# Patient Record
Sex: FEMALE | Employment: OTHER | URBAN - METROPOLITAN AREA
[De-identification: names, ages, dates, MRNs, and addresses within clinical notes are randomized per-mention and may not be internally consistent; named-entity substitution may affect disease eponyms.]

---

## 2017-12-07 ENCOUNTER — OFFICE VISIT (OUTPATIENT)
Dept: PAIN MEDICINE | Facility: CLINIC | Age: 82
End: 2017-12-07
Attending: ANESTHESIOLOGY
Payer: MEDICARE

## 2017-12-07 VITALS
HEART RATE: 74 BPM | SYSTOLIC BLOOD PRESSURE: 138 MMHG | HEIGHT: 64 IN | TEMPERATURE: 98 F | DIASTOLIC BLOOD PRESSURE: 59 MMHG | BODY MASS INDEX: 35.85 KG/M2 | WEIGHT: 210 LBS

## 2017-12-07 DIAGNOSIS — M96.1 POSTLAMINECTOMY SYNDROME OF LUMBAR REGION: ICD-10-CM

## 2017-12-07 DIAGNOSIS — M47.819 ARTHROPATHY OF FACET JOINTS AT MULTIPLE LEVELS: ICD-10-CM

## 2017-12-07 DIAGNOSIS — G89.4 CHRONIC PAIN SYNDROME: Primary | ICD-10-CM

## 2017-12-07 PROCEDURE — 99999 PR PBB SHADOW E&M-NEW PATIENT-LVL III: CPT | Mod: PBBFAC,,, | Performed by: ANESTHESIOLOGY

## 2017-12-07 PROCEDURE — 99204 OFFICE O/P NEW MOD 45 MIN: CPT | Mod: S$PBB,GC,, | Performed by: ANESTHESIOLOGY

## 2017-12-07 PROCEDURE — 99203 OFFICE O/P NEW LOW 30 MIN: CPT | Mod: PBBFAC | Performed by: ANESTHESIOLOGY

## 2017-12-07 RX ORDER — PANTOPRAZOLE SODIUM 40 MG/1
TABLET, DELAYED RELEASE ORAL
COMMUNITY
Start: 2017-10-30

## 2017-12-07 RX ORDER — DILTIAZEM HYDROCHLORIDE 240 MG/1
CAPSULE, EXTENDED RELEASE ORAL
COMMUNITY
Start: 2017-10-30

## 2017-12-07 RX ORDER — DONEPEZIL HYDROCHLORIDE 10 MG/1
TABLET, FILM COATED ORAL
Refills: 2 | COMMUNITY
Start: 2017-11-23

## 2017-12-07 RX ORDER — MELOXICAM 7.5 MG/1
TABLET ORAL
COMMUNITY
Start: 2017-10-31 | End: 2018-03-15 | Stop reason: SDUPTHER

## 2017-12-07 RX ORDER — HYDROCODONE BITARTRATE AND ACETAMINOPHEN 5; 325 MG/1; MG/1
TABLET ORAL
COMMUNITY
Start: 2017-10-30 | End: 2018-03-15 | Stop reason: SDUPTHER

## 2017-12-07 RX ORDER — MEMANTINE HYDROCHLORIDE 10 MG/1
TABLET ORAL
COMMUNITY
Start: 2017-10-30

## 2017-12-07 RX ORDER — IBUPROFEN 200 MG
TABLET ORAL
COMMUNITY
Start: 2017-11-13

## 2017-12-07 RX ORDER — CLONAZEPAM 1 MG/1
TABLET ORAL
COMMUNITY
Start: 2017-09-10

## 2017-12-07 RX ORDER — IBUPROFEN 800 MG/1
TABLET ORAL
COMMUNITY
Start: 2017-11-28

## 2017-12-07 RX ORDER — INSULIN GLARGINE 100 [IU]/ML
INJECTION, SOLUTION SUBCUTANEOUS
COMMUNITY
Start: 2017-11-28

## 2017-12-07 RX ORDER — QUETIAPINE FUMARATE 50 MG/1
TABLET, FILM COATED ORAL
Refills: 11 | COMMUNITY
Start: 2017-09-11
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
## 2018-01-04 ENCOUNTER — TELEPHONE (OUTPATIENT)
Dept: PAIN MEDICINE | Facility: CLINIC | Age: 83
End: 2018-01-04

## 2018-01-04 NOTE — TELEPHONE ENCOUNTER
Staff contacted the patient to rescheduled her 1/11/18 appointment with Dr. Dixon. After several rings a message stating the number had been disconnected displayed.    Staff went on to try the second option.   Staff contacted the patient's sister Ms. Yari Muñoz whom states that she will relay the message to the patient regarding her appointment being cancelled and needing to be rescheduled by calling [phone].    Ms. Greenberg vebalized understanding and expressed thanks.

## 2018-03-02 RX ORDER — DIAZEPAM 5 MG/1
5 TABLET ORAL ONCE AS NEEDED
Qty: 2 TABLET | Refills: 0 | Status: SHIPPED | OUTPATIENT
Start: 2018-03-02 | End: 2018-03-02

## 2018-03-05 ENCOUNTER — HOSPITAL ENCOUNTER (OUTPATIENT)
Dept: RADIOLOGY | Facility: OTHER | Age: 83
Discharge: HOME | End: 2018-03-05
Attending: ANESTHESIOLOGY
Payer: MEDICARE

## 2018-03-05 ENCOUNTER — TELEPHONE (OUTPATIENT)
Dept: PAIN MEDICINE | Facility: CLINIC | Age: 83
End: 2018-03-05

## 2018-03-05 DIAGNOSIS — G89.4 CHRONIC PAIN SYNDROME: ICD-10-CM

## 2018-03-05 DIAGNOSIS — M47.819 ARTHROPATHY OF FACET JOINTS AT MULTIPLE LEVELS: ICD-10-CM

## 2018-03-05 DIAGNOSIS — M96.1 POSTLAMINECTOMY SYNDROME OF LUMBAR REGION: ICD-10-CM

## 2018-03-05 PROCEDURE — 72148 MRI LUMBAR SPINE W/O DYE: CPT | Mod: TC

## 2018-03-05 PROCEDURE — 72148 MRI LUMBAR SPINE W/O DYE: CPT | Mod: 26,,, | Performed by: RADIOLOGY

## 2018-03-05 NOTE — TELEPHONE ENCOUNTER
----- Message from Jeff Looney sent at 3/5/2018 10:40 AM CST -----  Contact: patients daughter  _ 1st Request   x_ 2nd Request   _ 3rd Request     Who: NANCI LOTT [[id number]]    Why: patients daughter called stated she is still waiting for prescription for Valium to be called in for the patient who has MRI today at 12:00.  Please call the patient.    What Number to Call Back: [phone]    When to Expect a call back: (Before the end of the day)   -- if call after 3:00 call back will be tomorrow.

## 2018-03-05 NOTE — TELEPHONE ENCOUNTER
----- Message from Vy Dill sent at 3/5/2018  8:04 AM CST -----  _  1st Request  _  2nd Request  _  3rd Request        Who: patient daughter Arely     Why: Requesting a call back in regards to the RX of diazePAM (VALIUM) 5 MG tablet, the patient MRI is today at 12  Please return the call at earliest convenience. Thanks!    What Number to Call Back: [phone]      When to Expect a call back: (Within 24 hours)

## 2018-03-05 NOTE — TELEPHONE ENCOUNTER
Staff contacted the patient this morning to gather more information regarding the Valium.    Patients daughter states that the prescription had not been sent to the patients pharmacy. Patients daughter states that the patient has an MRI at 12 today 03/05/18.    Staff informed the patient that a message would be forwarded to you for approval.    Patients daughter verbalized understanding.     Pharmacy:[facility]

## 2018-03-15 ENCOUNTER — OFFICE VISIT (OUTPATIENT)
Dept: PAIN MEDICINE | Facility: CLINIC | Age: 83
End: 2018-03-15
Attending: ANESTHESIOLOGY
Payer: MEDICARE

## 2018-03-15 VITALS — BODY MASS INDEX: 35.85 KG/M2 | WEIGHT: 210 LBS | HEIGHT: 64 IN

## 2018-03-15 DIAGNOSIS — G89.4 CHRONIC PAIN SYNDROME: Primary | ICD-10-CM

## 2018-03-15 DIAGNOSIS — M96.1 LUMBAR POST-LAMINECTOMY SYNDROME: ICD-10-CM

## 2018-03-15 PROCEDURE — 99214 OFFICE O/P EST MOD 30 MIN: CPT | Mod: S$PBB,GC,, | Performed by: ANESTHESIOLOGY

## 2018-03-15 PROCEDURE — 99999 PR PBB SHADOW E&M-EST. PATIENT-LVL II: CPT | Mod: PBBFAC,,, | Performed by: ANESTHESIOLOGY

## 2018-03-15 PROCEDURE — 99212 OFFICE O/P EST SF 10 MIN: CPT | Mod: PBBFAC | Performed by: ANESTHESIOLOGY

## 2018-03-15 RX ORDER — DICLOFENAC SODIUM 10 MG/G
GEL TOPICAL
COMMUNITY
Start: 2018-03-02

## 2018-03-15 RX ORDER — DULOXETIN HYDROCHLORIDE 30 MG/1
30 CAPSULE, DELAYED RELEASE ORAL 2 TIMES DAILY
Qty: 60 CAPSULE | Refills: 2 | Status: SHIPPED | OUTPATIENT
Start: 2018-03-15 | End: 2018-06-13

## 2018-03-15 RX ORDER — MELOXICAM 7.5 MG/1
7.5 TABLET ORAL
Qty: 30 TABLET | Refills: 0 | Status: SHIPPED | OUTPATIENT
Start: 2018-03-15 | End: 2018-04-14

## 2018-03-15 RX ORDER — HYDROCODONE BITARTRATE AND ACETAMINOPHEN 5; 325 MG/1; MG/1
1 TABLET ORAL EVERY 12 HOURS PRN
Qty: 60 TABLET | Refills: 0 | Status: SHIPPED | OUTPATIENT
Start: 2018-03-15 | End: 2018-04-14

## 2018-03-15 NOTE — PROGRESS NOTES
Chronic patient Established Note (Follow up visit)      SUBJECTIVE:    Raquel Tavares presents to the clinic for a follow-up appointment for back pain. Since the last visit, Raquel Tavares states the pain has been persistant. Current pain intensity is 9/10.    The pt is a 85 yo F w/ significant dementia who presents with both daughters for follow up of back pain. Pt last seen 12/11/17. The pain has not changed significantly since the last visit and if anything has become worse. The pt does not verbalize a pain score upon questioning but her daughters would estimate it is constantly between 7-9. Her pain is worse with walking, exertion. Her pain seems to get better with rest per daughters. Since last visit pt did have MRI performed. However, she did not start the cymbalta or have plain films taken.        Patient/patient daughters denies night fever/night sweats, urinary incontinence, bowel incontinence, significant weight loss, significant motor weakness and loss of sensations.    Pain Disability Index Review:  Last 3 PDI Scores 3/15/2018   Pain Disability Index (PDI) 0       Pain Medications:    - Opioids: None  - Adjuvant Medications: Advil,Motrin ( Ibuprofen) and Mobic (Meloxicam)  - Anti-Coagulants: None  - Others: See med list    Opioid Contract: no     report:  Reviewed and consistent with medication use as prescribed.    Pain Procedures: None    Physical Therapy/Home Exercise: No    Imaging:MRI Lumbar Spine Without Contrast    Narrative     MRI LUMBAR SPINE    TECHNIQUE: MRI lumbar spine was performed without contrast. The following sequences were obtained: Localizer; sagittal T1, T2, STIR; axial T1 and T2.    COMPARISON: Not available.    FINDINGS:    There are 5 lumbar vertebrae.  Status post L3-S1 laminectomy.  There is grade one anterolisthesis at L4-L5.  Vertebral body heights are maintained.  Degenerative endplate changes are noted.  No evidence for marrow infiltrative process.  There is multilevel  disc desiccation with severe disc height loss at L3-L4. Conus terminates at L1 and appears unremarkable. Limited evaluation of posterior abdominal structures is unremarkable.  Paraspinal musculature is moderately atrophied.  Evaluation of sacroiliac joints is unremarkable.    T12-L1: Circumferential disc bulge results in mild bilateral neuroforaminal narrowing.    L1-L2: Circumferential disc bulge results in mild right neuroforaminal narrowing.    L2-L3: Circumferential disc bulge and mild bilateral facet arthropathy result in mild spinal canal stenosis and mild bilateral neuroforaminal narrowing.    L3-L4: Circumferential disc bulge and moderate bilateral facet arthrosis result in moderate spinal canal stenosis and mild right, moderate left neuroforaminal narrowing.  Status post posterior decompression.    L4-L5: Circumferential disc bulge and severe bilateral facet arthrosis result in severe spinal canal stenosis and mild bilateral neuroforaminal narrowing.  Status post posterior decompression.    L5-S1: Circumferential disc bulge and mild bilateral facet arthrosis result in mild bilateral neuroforaminal narrowing.  Status post posterior decompression.   Impression      Status post L3-S1 laminectomy.  Multilevel degenerative changes as detailed above.      Electronically signed by: MALI MOHR MD  Date: 03/05/18       Allergies: Review of patient's allergies indicates:  No Known Allergies    Current Medications:   Current Outpatient Prescriptions   Medication Sig Dispense Refill    clonazePAM (KLONOPIN) 1 MG tablet       diltiaZEM (TIAZAC) 240 MG Cs24       donepezil (ARICEPT) 10 MG tablet TK 1 T PO HS  2    hydrocodone-acetaminophen 5-325mg (NORCO) 5-325 mg per tablet       ibuprofen (ADVIL,MOTRIN) 800 MG tablet       LANTUS 100 unit/mL injection       meloxicam (MOBIC) 7.5 MG tablet       memantine (NAMENDA) 10 MG Tab       pantoprazole (PROTONIX) 40 MG tablet       QUEtiapine (SEROQUEL) 50 MG tablet  "TK 1 T PO QHS  11    SYRINGE AND NEEDLE,INSULIN,1ML (INSULIN SYRINGE-NEEDLE U-100) 1 mL 29 gauge x 7/16" Syrg       VOLTAREN 1 % Gel       diazePAM (VALIUM) 5 MG tablet Take 1 tablet (5 mg total) by mouth once as needed for Anxiety. 2 tablet 0     No current facility-administered medications for this visit.        REVIEW OF SYSTEMS:    Obtained from pt and pt's daughters   GENERAL:  No weight loss, malaise or fevers.  HEENT:  Negative for frequent or significant headaches.  NECK:  Negative for lumps, goiter, pain and significant neck swelling.  RESPIRATORY:  Negative for cough, wheezing or shortness of breath.  CARDIOVASCULAR:  Negative for chest pain or palpitations. + leg swelling bilaterally   GI:  Negative for abdominal discomfort, blood in stools or black stools or change in bowel habits.  MUSCULOSKELETAL:  See HPI.  SKIN:  Negative for lesions, rash, and itching.  PSYCH: +'ve for sleep disturbance, negative for mood disorder and recent psychosocial stressors.  HEMATOLOGY/LYMPHOLOGY:  Negative for prolonged bleeding, bruising easily or swollen nodes.  NEURO:   No history of headaches, syncope, paralysis, seizures or tremors.  All other reviewed and negative other than HPI.    Past Medical History:  History reviewed. No pertinent past medical history.    Past Surgical History:  History reviewed. No pertinent surgical history.    Family History:  History reviewed. No pertinent family history.    Social History:  Social History     Social History    Marital status:      Spouse name: N/A    Number of children: N/A    Years of education: N/A     Social History Main Topics    Smoking status: Never Smoker    Smokeless tobacco: Never Used    Alcohol use None    Drug use: Unknown    Sexual activity: Not Asked     Other Topics Concern    None     Social History Narrative    None       OBJECTIVE:    Ht 5' 4" (1.626 m)   Wt 95.3 kg (210 lb)   BMI 36.05 kg/m²     PHYSICAL EXAMINATION:    General " appearance: Well appearing, in no acute distress, alert. Pt in wheelchair.   Psych:  Mood and affect appropriate.  Skin: Skin color, texture, turgor normal, no rashes or lesions, in both upper and lower body.  Head/face:  Normocephalic, atraumatic. No palpable lymph nodes.  Neck: No pain to palpation over the cervical paraspinous muscles. No pain with neck flexion, extension, or lateral flexion.   Cor: RRR  Pulm: CTA  GI:  Soft and non-tender.  Back: Straight leg raising in the sitting and supine positions is negative to radicular pain. Severe pain to palpation over the spine. Decreased range of motion with pain reproduction. Positive axial loading test bilateral. Positive FABERE,Ganselin and Yeoman's test on the both side.   Extremities: Peripheral joint ROM is decreased and pain free without obvious instability or laxity in all four extremities. No deformities, edema, or skin discoloration. Good capillary refill.  Musculoskeletal: Shoulder and knee provocative maneuvers are negative. Bilateral upper and lower extremity strength is normal and symmetric.  No atrophy or tone abnormalities are noted.    ASSESSMENT: 86 y.o. year old female with low back pain, consistent with      1. Chronic pain syndrome  meloxicam (MOBIC) 7.5 MG tablet    DULoxetine (CYMBALTA) 30 MG capsule    hydrocodone-acetaminophen 5-325mg (NORCO) 5-325 mg per tablet   2. Lumbar post-laminectomy syndrome  meloxicam (MOBIC) 7.5 MG tablet    DULoxetine (CYMBALTA) 30 MG capsule    hydrocodone-acetaminophen 5-325mg (NORCO) 5-325 mg per tablet         PLAN:     - I have stressed the importance of physical activity and a home exercise plan to help with pain and improve health.  - Patient can continue with medications for now since they are providing benefits, using them appropriately, and without side effects.  - Schedule for a caudal epidural injection to help with pain and progress with a Home exercise program.  - Will have pt's daughter release  records to obtain plain films from OSH. If OSH records do not have lumbar plain films and hip plain films we will order these at [facility] [place].   - Discussed potential with pt's family of SCS therapy in the future pending response to caudal injection   - Start Norco and will have pt's daughter sign opioid contract  - Continue meloxicam   - As discussed during previous visit, will start cymbalta therapy   -obtain UDS next visit  - RTC 2 weeks following procedure   - Counseled patient regarding the importance of activity modification, constant sleeping habits and physical therapy.    The above plan and management options were discussed at length with patient. Patient is in agreement with the above and verbalized understanding.    Mario Davis  03/15/2018  Anesthesiology Resident PGY 4    I have personally reviewed the history and exam of this patient and agree with the resident/fellow/NPs note as stated above.    Blas Dixon MD

## 2018-03-19 ENCOUNTER — TELEPHONE (OUTPATIENT)
Dept: PAIN MEDICINE | Facility: CLINIC | Age: 83
End: 2018-03-19

## 2018-03-19 NOTE — TELEPHONE ENCOUNTER
Contacted and spoke with pt's daughter regarding letter with diagnosis. Pt was informed she should have received an after visit summary which would have diagnosis listed. Pt stated she did not receive one after her visit. She was then informed she can either have one mailed out to her home, or she can come  To the office and pick one up. Pt will up a AVS today.      Pt verbalized understanding

## 2018-03-19 NOTE — TELEPHONE ENCOUNTER
----- Message from Cheryl Slater sent at 3/19/2018  9:51 AM CDT -----  Contact: Daughter. [phone]  Patient's daughter would like to speak with you about getting a paper that states the patient's diagnosis. Please advise

## 2018-03-28 ENCOUNTER — HOSPITAL ENCOUNTER (OUTPATIENT)
Facility: OTHER | Age: 83
Discharge: HOME | End: 2018-03-28
Attending: ANESTHESIOLOGY | Admitting: ANESTHESIOLOGY
Payer: MEDICARE

## 2018-03-28 ENCOUNTER — SURGERY (OUTPATIENT)
Age: 83
End: 2018-03-28

## 2018-03-28 VITALS
WEIGHT: 210 LBS | BODY MASS INDEX: 35.85 KG/M2 | HEIGHT: 64 IN | HEART RATE: 63 BPM | DIASTOLIC BLOOD PRESSURE: 86 MMHG | OXYGEN SATURATION: 98 % | SYSTOLIC BLOOD PRESSURE: 148 MMHG | RESPIRATION RATE: 18 BRPM | TEMPERATURE: 98 F

## 2018-03-28 DIAGNOSIS — M51.36 DDD (DEGENERATIVE DISC DISEASE), LUMBAR: Primary | ICD-10-CM

## 2018-03-28 DIAGNOSIS — M54.17 LUMBOSACRAL RADICULOPATHY: ICD-10-CM

## 2018-03-28 DIAGNOSIS — G89.29 CHRONIC PAIN: ICD-10-CM

## 2018-03-28 LAB
GLUCOSE SERPL-MCNC: 158 MG/DL (ref 70–110)
POCT GLUCOSE: 158 MG/DL (ref 70–110)

## 2018-03-28 PROCEDURE — 82947 ASSAY GLUCOSE BLOOD QUANT: CPT | Performed by: ANESTHESIOLOGY

## 2018-03-28 PROCEDURE — 25000003 PHARM REV CODE 250: Performed by: ANESTHESIOLOGY

## 2018-03-28 RX ORDER — SODIUM CHLORIDE 9 MG/ML
500 INJECTION, SOLUTION INTRAVENOUS CONTINUOUS
Status: DISCONTINUED | OUTPATIENT
Start: 2018-03-28 | End: 2018-03-28 | Stop reason: HOSPADM

## 2018-03-28 RX ADMIN — SODIUM CHLORIDE 500 ML: 0.9 INJECTION, SOLUTION INTRAVENOUS at 01:03

## 2018-03-28 NOTE — H&P (VIEW-ONLY)
Chronic patient Established Note (Follow up visit)      SUBJECTIVE:    Raquel Tavares presents to the clinic for a follow-up appointment for back pain. Since the last visit, Raquel Tavares states the pain has been persistant. Current pain intensity is 9/10.    The pt is a 87 yo F w/ significant dementia who presents with both daughters for follow up of back pain. Pt last seen 12/11/17. The pain has not changed significantly since the last visit and if anything has become worse. The pt does not verbalize a pain score upon questioning but her daughters would estimate it is constantly between 7-9. Her pain is worse with walking, exertion. Her pain seems to get better with rest per daughters. Since last visit pt did have MRI performed. However, she did not start the cymbalta or have plain films taken.        Patient/patient daughters denies night fever/night sweats, urinary incontinence, bowel incontinence, significant weight loss, significant motor weakness and loss of sensations.    Pain Disability Index Review:  Last 3 PDI Scores 3/15/2018   Pain Disability Index (PDI) 0       Pain Medications:    - Opioids: None  - Adjuvant Medications: Advil,Motrin ( Ibuprofen) and Mobic (Meloxicam)  - Anti-Coagulants: None  - Others: See med list    Opioid Contract: no     report:  Reviewed and consistent with medication use as prescribed.    Pain Procedures: None    Physical Therapy/Home Exercise: No    Imaging:MRI Lumbar Spine Without Contrast    Narrative     MRI LUMBAR SPINE    TECHNIQUE: MRI lumbar spine was performed without contrast. The following sequences were obtained: Localizer; sagittal T1, T2, STIR; axial T1 and T2.    COMPARISON: Not available.    FINDINGS:    There are 5 lumbar vertebrae.  Status post L3-S1 laminectomy.  There is grade one anterolisthesis at L4-L5.  Vertebral body heights are maintained.  Degenerative endplate changes are noted.  No evidence for marrow infiltrative process.  There is multilevel  disc desiccation with severe disc height loss at L3-L4. Conus terminates at L1 and appears unremarkable. Limited evaluation of posterior abdominal structures is unremarkable.  Paraspinal musculature is moderately atrophied.  Evaluation of sacroiliac joints is unremarkable.    T12-L1: Circumferential disc bulge results in mild bilateral neuroforaminal narrowing.    L1-L2: Circumferential disc bulge results in mild right neuroforaminal narrowing.    L2-L3: Circumferential disc bulge and mild bilateral facet arthropathy result in mild spinal canal stenosis and mild bilateral neuroforaminal narrowing.    L3-L4: Circumferential disc bulge and moderate bilateral facet arthrosis result in moderate spinal canal stenosis and mild right, moderate left neuroforaminal narrowing.  Status post posterior decompression.    L4-L5: Circumferential disc bulge and severe bilateral facet arthrosis result in severe spinal canal stenosis and mild bilateral neuroforaminal narrowing.  Status post posterior decompression.    L5-S1: Circumferential disc bulge and mild bilateral facet arthrosis result in mild bilateral neuroforaminal narrowing.  Status post posterior decompression.   Impression      Status post L3-S1 laminectomy.  Multilevel degenerative changes as detailed above.      Electronically signed by: MALI MOHR MD  Date: 03/05/18       Allergies: Review of patient's allergies indicates:  No Known Allergies    Current Medications:   Current Outpatient Prescriptions   Medication Sig Dispense Refill    clonazePAM (KLONOPIN) 1 MG tablet       diltiaZEM (TIAZAC) 240 MG Cs24       donepezil (ARICEPT) 10 MG tablet TK 1 T PO HS  2    hydrocodone-acetaminophen 5-325mg (NORCO) 5-325 mg per tablet       ibuprofen (ADVIL,MOTRIN) 800 MG tablet       LANTUS 100 unit/mL injection       meloxicam (MOBIC) 7.5 MG tablet       memantine (NAMENDA) 10 MG Tab       pantoprazole (PROTONIX) 40 MG tablet       QUEtiapine (SEROQUEL) 50 MG tablet  "TK 1 T PO QHS  11    SYRINGE AND NEEDLE,INSULIN,1ML (INSULIN SYRINGE-NEEDLE U-100) 1 mL 29 gauge x 7/16" Syrg       VOLTAREN 1 % Gel       diazePAM (VALIUM) 5 MG tablet Take 1 tablet (5 mg total) by mouth once as needed for Anxiety. 2 tablet 0     No current facility-administered medications for this visit.        REVIEW OF SYSTEMS:    Obtained from pt and pt's daughters   GENERAL:  No weight loss, malaise or fevers.  HEENT:  Negative for frequent or significant headaches.  NECK:  Negative for lumps, goiter, pain and significant neck swelling.  RESPIRATORY:  Negative for cough, wheezing or shortness of breath.  CARDIOVASCULAR:  Negative for chest pain or palpitations. + leg swelling bilaterally   GI:  Negative for abdominal discomfort, blood in stools or black stools or change in bowel habits.  MUSCULOSKELETAL:  See HPI.  SKIN:  Negative for lesions, rash, and itching.  PSYCH: +'ve for sleep disturbance, negative for mood disorder and recent psychosocial stressors.  HEMATOLOGY/LYMPHOLOGY:  Negative for prolonged bleeding, bruising easily or swollen nodes.  NEURO:   No history of headaches, syncope, paralysis, seizures or tremors.  All other reviewed and negative other than HPI.    Past Medical History:  History reviewed. No pertinent past medical history.    Past Surgical History:  History reviewed. No pertinent surgical history.    Family History:  History reviewed. No pertinent family history.    Social History:  Social History     Social History    Marital status:      Spouse name: N/A    Number of children: N/A    Years of education: N/A     Social History Main Topics    Smoking status: Never Smoker    Smokeless tobacco: Never Used    Alcohol use None    Drug use: Unknown    Sexual activity: Not Asked     Other Topics Concern    None     Social History Narrative    None       OBJECTIVE:    Ht 5' 4" (1.626 m)   Wt 95.3 kg (210 lb)   BMI 36.05 kg/m²     PHYSICAL EXAMINATION:    General " appearance: Well appearing, in no acute distress, alert. Pt in wheelchair.   Psych:  Mood and affect appropriate.  Skin: Skin color, texture, turgor normal, no rashes or lesions, in both upper and lower body.  Head/face:  Normocephalic, atraumatic. No palpable lymph nodes.  Neck: No pain to palpation over the cervical paraspinous muscles. No pain with neck flexion, extension, or lateral flexion.   Cor: RRR  Pulm: CTA  GI:  Soft and non-tender.  Back: Straight leg raising in the sitting and supine positions is negative to radicular pain. Severe pain to palpation over the spine. Decreased range of motion with pain reproduction. Positive axial loading test bilateral. Positive FABERE,Ganselin and Yeoman's test on the both side.   Extremities: Peripheral joint ROM is decreased and pain free without obvious instability or laxity in all four extremities. No deformities, edema, or skin discoloration. Good capillary refill.  Musculoskeletal: Shoulder and knee provocative maneuvers are negative. Bilateral upper and lower extremity strength is normal and symmetric.  No atrophy or tone abnormalities are noted.    ASSESSMENT: 86 y.o. year old female with low back pain, consistent with      1. Chronic pain syndrome  meloxicam (MOBIC) 7.5 MG tablet    DULoxetine (CYMBALTA) 30 MG capsule    hydrocodone-acetaminophen 5-325mg (NORCO) 5-325 mg per tablet   2. Lumbar post-laminectomy syndrome  meloxicam (MOBIC) 7.5 MG tablet    DULoxetine (CYMBALTA) 30 MG capsule    hydrocodone-acetaminophen 5-325mg (NORCO) 5-325 mg per tablet         PLAN:     - I have stressed the importance of physical activity and a home exercise plan to help with pain and improve health.  - Patient can continue with medications for now since they are providing benefits, using them appropriately, and without side effects.  - Schedule for a caudal epidural injection to help with pain and progress with a Home exercise program.  - Will have pt's daughter release  records to obtain plain films from OSH. If OSH records do not have lumbar plain films and hip plain films we will order these at [facility] [place] [facility].   - Discussed potential with pt's family of SCS therapy in the future pending response to caudal injection   - Start Norco and will have pt's daughter sign opioid contract  - Continue meloxicam   - As discussed during previous visit, will start cymbalta therapy   -obtain UDS next visit  - RTC 2 weeks following procedure   - Counseled patient regarding the importance of activity modification, constant sleeping habits and physical therapy.    The above plan and management options were discussed at length with patient. Patient is in agreement with the above and verbalized understanding.    Mario Davis  03/15/2018  Anesthesiology Resident PGY 4    I have personally reviewed the history and exam of this patient and agree with the resident/fellow/NPs note as stated above.    Blas Dixon MD

## 2018-03-28 NOTE — PROGRESS NOTES
Pt brought to procedure room with daughter present. Pt refusing to have procedure performed. Pt removed from procedure room with daughter and procedure not performed.     Mario Loja MD   Anesthesiology Resident PGY 4

## 2018-04-26 ENCOUNTER — OFFICE VISIT (OUTPATIENT)
Dept: PAIN MEDICINE | Facility: CLINIC | Age: 83
End: 2018-04-26
Attending: ANESTHESIOLOGY
Payer: MEDICARE

## 2018-04-26 VITALS
SYSTOLIC BLOOD PRESSURE: 139 MMHG | DIASTOLIC BLOOD PRESSURE: 62 MMHG | HEIGHT: 64 IN | RESPIRATION RATE: 18 BRPM | TEMPERATURE: 98 F | HEART RATE: 67 BPM

## 2018-04-26 DIAGNOSIS — M47.819 SPONDYLOSIS WITHOUT MYELOPATHY: ICD-10-CM

## 2018-04-26 DIAGNOSIS — M54.15 RADICULOPATHY OF THORACOLUMBAR REGION: ICD-10-CM

## 2018-04-26 DIAGNOSIS — M48.00 SPINAL STENOSIS, UNSPECIFIED SPINAL REGION: ICD-10-CM

## 2018-04-26 DIAGNOSIS — M51.36 DDD (DEGENERATIVE DISC DISEASE), LUMBAR: Primary | ICD-10-CM

## 2018-04-26 PROCEDURE — 99214 OFFICE O/P EST MOD 30 MIN: CPT | Mod: S$PBB,,, | Performed by: ANESTHESIOLOGY

## 2018-04-26 PROCEDURE — 99999 PR PBB SHADOW E&M-EST. PATIENT-LVL III: CPT | Mod: PBBFAC,,, | Performed by: ANESTHESIOLOGY

## 2018-04-26 PROCEDURE — 99213 OFFICE O/P EST LOW 20 MIN: CPT | Mod: PBBFAC | Performed by: ANESTHESIOLOGY

## 2018-04-26 NOTE — PROGRESS NOTES
Chronic patient Established Note (Follow up visit)      SUBJECTIVE:    Raquel Tavares presents to the clinic for a follow-up appointment for low back pain. Since the last visit, Raquel Tavares states the pain has been persistant. Current pain intensity is 9/10.  Patient suffers from schizophrenia and was not able to perform her procedure last week    Discussed with her daughter that may be the surgical route would be a better option as I do not recommend such a procedure under deep sedation or general anesthesia       Pain Disability Index Review:  Last 3 PDI Scores 3/15/2018   Pain Disability Index (PDI) 0       Pain Medications:    - Adjuvant Medications: Cymbalta ( Duloxetine) and Mobic (Meloxicam)    Opioid Contract: no     report:  Reviewed and consistent with medication use as prescribed.    Pain Procedures:None    Physical Therapy/Home Exercise: no    Imaging:  MRI Lumbar Spine Without Contrast     Narrative      MRI LUMBAR SPINE    TECHNIQUE: MRI lumbar spine was performed without contrast. The following sequences were obtained: Localizer; sagittal T1, T2, STIR; axial T1 and T2.    COMPARISON: Not available.    FINDINGS:    There are 5 lumbar vertebrae.  Status post L3-S1 laminectomy.  There is grade one anterolisthesis at L4-L5.  Vertebral body heights are maintained.  Degenerative endplate changes are noted.  No evidence for marrow infiltrative process.  There is multilevel disc desiccation with severe disc height loss at L3-L4. Conus terminates at L1 and appears unremarkable. Limited evaluation of posterior abdominal structures is unremarkable.  Paraspinal musculature is moderately atrophied.  Evaluation of sacroiliac joints is unremarkable.    T12-L1: Circumferential disc bulge results in mild bilateral neuroforaminal narrowing.    L1-L2: Circumferential disc bulge results in mild right neuroforaminal narrowing.    L2-L3: Circumferential disc bulge and mild bilateral facet arthropathy result in mild  "spinal canal stenosis and mild bilateral neuroforaminal narrowing.    L3-L4: Circumferential disc bulge and moderate bilateral facet arthrosis result in moderate spinal canal stenosis and mild right, moderate left neuroforaminal narrowing.  Status post posterior decompression.    L4-L5: Circumferential disc bulge and severe bilateral facet arthrosis result in severe spinal canal stenosis and mild bilateral neuroforaminal narrowing.  Status post posterior decompression.    L5-S1: Circumferential disc bulge and mild bilateral facet arthrosis result in mild bilateral neuroforaminal narrowing.  Status post posterior decompression.   Impression       Status post L3-S1 laminectomy.  Multilevel degenerative changes as detailed above.      Electronically signed by: MALI MOHR MD  Date: 03/05/18          Allergies: Review of patient's allergies indicates:  No Known Allergies    Current Medications:   Current Outpatient Prescriptions   Medication Sig Dispense Refill    clonazePAM (KLONOPIN) 1 MG tablet       diazePAM (VALIUM) 5 MG tablet Take 1 tablet (5 mg total) by mouth once as needed for Anxiety. 2 tablet 0    diltiaZEM (TIAZAC) 240 MG Cs24       donepezil (ARICEPT) 10 MG tablet TK 1 T PO HS  2    DULoxetine (CYMBALTA) 30 MG capsule Take 1 capsule (30 mg total) by mouth 2 (two) times daily. 60 capsule 2    ibuprofen (ADVIL,MOTRIN) 800 MG tablet       LANTUS 100 unit/mL injection       memantine (NAMENDA) 10 MG Tab       pantoprazole (PROTONIX) 40 MG tablet       QUEtiapine (SEROQUEL) 50 MG tablet TK 1 T PO QHS  11    SYRINGE AND NEEDLE,INSULIN,1ML (INSULIN SYRINGE-NEEDLE U-100) 1 mL 29 gauge x 7/16" Syrg       VOLTAREN 1 % Gel        No current facility-administered medications for this visit.        REVIEW OF SYSTEMS:    GENERAL:  No weight loss, malaise or fevers.  HEENT:  Negative for frequent or significant headaches.  NECK:  Negative for lumps, goiter, pain and significant neck swelling.  RESPIRATORY:  " "Negative for cough, wheezing or shortness of breath.  CARDIOVASCULAR:  Negative for chest pain, leg swelling or palpitations.  GI:  Negative for abdominal discomfort, blood in stools or black stools or change in bowel habits.  MUSCULOSKELETAL:  See HPI.  SKIN:  Negative for lesions, rash, and itching.  PSYCH:  +ve for sleep disturbance, mood disorder and recent psychosocial stressors.  HEMATOLOGY/LYMPHOLOGY:  Negative for prolonged bleeding, bruising easily or swollen nodes.  NEURO:   No history of headaches, syncope, paralysis, seizures or tremors.  All other reviewed and negative other than HPI.    Past Medical History:  Past Medical History:   Diagnosis Date    Diabetes mellitus     Hypertension        Past Surgical History:  No past surgical history on file.    Family History:  No family history on file.    Social History:  Social History     Social History    Marital status:      Spouse name: N/A    Number of children: N/A    Years of education: N/A     Social History Main Topics    Smoking status: Never Smoker    Smokeless tobacco: Never Used    Alcohol use Not on file    Drug use: Unknown    Sexual activity: Not on file     Other Topics Concern    Not on file     Social History Narrative    No narrative on file       OBJECTIVE:    /62   Pulse 67   Temp 98 °F (36.7 °C)   Resp 18   Ht 5' 4" (1.626 m)     PHYSICAL EXAMINATION:    General appearance: Well appearing, in no acute distress, alert. Pt in wheelchair.   Psych: flat affect   Skin: Skin color, texture, turgor normal, no rashes or lesions, in both upper and lower body.  Head/face:  Normocephalic, atraumatic. No palpable lymph nodes.  Neck: No pain to palpation over the cervical paraspinous muscles. No pain with neck flexion, extension, or lateral flexion.   Cor: RRR  Pulm: CTA  GI:  Soft and non-tender.  Back: Straight leg raising in the sitting and supine positions is negative to radicular pain. Severe pain to palpation over " the spine. Decreased range of motion with pain reproduction. Positive axial loading test bilateral. Positive FABERE,Ganselin and Yeoman's test on the both side.   Extremities: Peripheral joint ROM is decreased and pain free without obvious instability or laxity in all four extremities. No deformities, edema, or skin discoloration. Good capillary refill.  Musculoskeletal: Shoulder and knee provocative maneuvers are negative. Bilateral upper and lower extremity strength is normal and symmetric.  No atrophy or tone abnormalities are noted.   Gait: antalgic     ASSESSMENT: 86 y.o. year old female  pain, consistent with      1. DDD (degenerative disc disease), lumbar     2. Spondylosis without myelopathy     3. Spinal stenosis, unspecified spinal region     4. Radiculopathy of thoracolumbar region           PLAN:     - I have stressed the importance of physical activity and a home exercise plan to help with pain and improve health.  - Patient can continue with medications for now since they are providing benefits, using them appropriately, and without side effects.  -patient will discuss with surgeon for surgical evaluation also requested a referral for a pain providers to check if they can do a caudal under [place] [facility] [place]    - RTC as needed for f/u  - Counseled patient regarding the importance of activity modification, constant sleeping habits and physical therapy.    The above plan and management options were discussed at length with patient. Patient is in agreement with the above and verbalized understanding.    Blas Dixon MD    04/26/2018